# Patient Record
Sex: MALE | Race: WHITE
[De-identification: names, ages, dates, MRNs, and addresses within clinical notes are randomized per-mention and may not be internally consistent; named-entity substitution may affect disease eponyms.]

---

## 2020-11-01 ENCOUNTER — HOSPITAL ENCOUNTER (EMERGENCY)
Dept: HOSPITAL 61 - ER | Age: 60
Discharge: HOME | End: 2020-11-01
Payer: COMMERCIAL

## 2020-11-01 VITALS — WEIGHT: 190.26 LBS | BODY MASS INDEX: 25.22 KG/M2 | HEIGHT: 73 IN

## 2020-11-01 VITALS
SYSTOLIC BLOOD PRESSURE: 176 MMHG | SYSTOLIC BLOOD PRESSURE: 176 MMHG | DIASTOLIC BLOOD PRESSURE: 88 MMHG | DIASTOLIC BLOOD PRESSURE: 88 MMHG

## 2020-11-01 DIAGNOSIS — M25.562: ICD-10-CM
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
## 2020-11-01 DIAGNOSIS — M25.531: ICD-10-CM

## 2020-11-01 DIAGNOSIS — M79.641: ICD-10-CM

## 2020-11-01 DIAGNOSIS — E78.00: ICD-10-CM

## 2020-11-01 DIAGNOSIS — M54.2: ICD-10-CM

## 2020-11-01 DIAGNOSIS — G89.11: Primary | ICD-10-CM
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
## 2020-11-01 DIAGNOSIS — M54.5: ICD-10-CM

## 2020-11-01 DIAGNOSIS — F17.200: ICD-10-CM

## 2020-11-01 PROCEDURE — 73110 X-RAY EXAM OF WRIST: CPT

## 2020-11-01 PROCEDURE — 73562 X-RAY EXAM OF KNEE 3: CPT

## 2020-11-01 PROCEDURE — 99284 EMERGENCY DEPT VISIT MOD MDM: CPT

## 2020-11-01 PROCEDURE — 72202 X-RAY EXAM SI JOINTS 3/> VWS: CPT

## 2020-11-01 PROCEDURE — 73120 X-RAY EXAM OF HAND: CPT

## 2020-11-01 NOTE — RAD
Right wrist x-rays 3 views

 

HISTORY: Right wrist pain.

 

FINDINGS: Well-defined chronic ossicle dorsal carpus. No acute fracture. 

No dislocation. Soft tissues unremarkable.

 

IMPRESSION: No acute osseous injury. Chronic ossicle along the dorsal 

carpus could be due to an old injury or a congenital carpal boss.

 

Electronically signed by: Charlie Blount MD (11/1/2020 10:34 AM) 

[id number]
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
## 2020-11-17 ENCOUNTER — HOSPITAL ENCOUNTER (OUTPATIENT)
Dept: HOSPITAL 61 - KCIC CT | Age: 60
End: 2020-11-17
Attending: FAMILY MEDICINE
Payer: SELF-PAY

## 2020-11-17 DIAGNOSIS — G31.89: ICD-10-CM
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
## 2020-11-17 DIAGNOSIS — S09.90XA: Primary | ICD-10-CM
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
## 2020-11-17 PROCEDURE — 70450 CT HEAD/BRAIN W/O DYE: CPT
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
